# Patient Record
Sex: MALE | Race: WHITE | NOT HISPANIC OR LATINO | Employment: UNEMPLOYED | URBAN - METROPOLITAN AREA
[De-identification: names, ages, dates, MRNs, and addresses within clinical notes are randomized per-mention and may not be internally consistent; named-entity substitution may affect disease eponyms.]

---

## 2018-02-12 ENCOUNTER — HOSPITAL ENCOUNTER (EMERGENCY)
Facility: OTHER | Age: 28
Discharge: HOME | End: 2018-02-12
Attending: EMERGENCY MEDICINE
Payer: COMMERCIAL

## 2018-02-12 VITALS
TEMPERATURE: 98 F | HEIGHT: 71 IN | HEART RATE: 74 BPM | SYSTOLIC BLOOD PRESSURE: 125 MMHG | OXYGEN SATURATION: 98 % | RESPIRATION RATE: 18 BRPM | WEIGHT: 195 LBS | DIASTOLIC BLOOD PRESSURE: 58 MMHG | BODY MASS INDEX: 27.3 KG/M2

## 2018-02-12 DIAGNOSIS — S63.91XA SPRAIN OF RIGHT HAND, INITIAL ENCOUNTER: Primary | ICD-10-CM

## 2018-02-12 PROCEDURE — 99283 EMERGENCY DEPT VISIT LOW MDM: CPT

## 2018-02-12 RX ORDER — IBUPROFEN 800 MG/1
800 TABLET ORAL EVERY 6 HOURS PRN
Qty: 15 TABLET | Refills: 0 | Status: SHIPPED | OUTPATIENT
Start: 2018-02-12

## 2018-02-12 NOTE — ED TRIAGE NOTES
Pt reports R hand pain after playing frisbee 2 weeks ago. Reports pain hasn't been improving. Pt reports swelling has gone down but pain to R base of thumb continues.

## 2018-02-12 NOTE — ED PROVIDER NOTES
Encounter Date: 2/12/2018       History     Chief Complaint   Patient presents with    Hand Pain     R palm x2 days after trip and fall     Patient is a 27-year-old male with no pertinent past medical history presents to the ED with hand pain.  Patient states approximately 2 weeks ago was playing Frisbee when he fell on his outstretched right hand.  He reports swelling and pain to the thenar aspect of his right hand.  He states the swelling subsided one-week ago but the pain has persisted.  He reports taking 1000 mg of ibuprofen with no relief.  He denies numbness or tingling to this area.      The history is provided by the patient.     Review of patient's allergies indicates:  No Known Allergies  History reviewed. No pertinent past medical history.  Past Surgical History:   Procedure Laterality Date    EYE SURGERY       History reviewed. No pertinent family history.  Social History   Substance Use Topics    Smoking status: Current Every Day Smoker     Types: Cigarettes    Smokeless tobacco: Not on file    Alcohol use Yes      Comment: socially     Review of Systems   Constitutional: Negative for chills and fever.   HENT: Negative for congestion and sore throat.    Eyes: Negative for pain.   Respiratory: Negative for shortness of breath.    Cardiovascular: Negative for chest pain.   Gastrointestinal: Negative for abdominal pain, diarrhea, nausea and vomiting.   Genitourinary: Negative for dysuria.   Musculoskeletal:        Right hand pain   Skin: Negative for rash.   Neurological: Negative for headaches.       Physical Exam     Initial Vitals [02/12/18 1221]   BP Pulse Resp Temp SpO2   (!) 143/63 90 18 97.6 °F (36.4 °C) 99 %      MAP       89.67         Physical Exam    Constitutional: Vital signs are normal. He is not diaphoretic. He is cooperative. No distress.   HENT:   Head: Normocephalic and atraumatic.   Mouth/Throat: Oropharynx is clear and moist.   Eyes: Conjunctivae and EOM are normal. Pupils are  equal, round, and reactive to light.   Neck: Normal range of motion. Neck supple.   Cardiovascular: Normal rate, regular rhythm and intact distal pulses.   No murmur heard.  Pulmonary/Chest: Breath sounds normal. He has no wheezes. He has no rhonchi. He has no rales.   Abdominal: Soft. Bowel sounds are normal. There is no tenderness. There is no rebound and no guarding.   Musculoskeletal:   RUE: Full range of motion to right wrist.  No obvious deformity or edema.  Reported pain with adduction of the thumb and with palpation of the thenar eminence.  No snuffbox tenderness.   Neurological: He is alert and oriented to person, place, and time. He has normal strength. No cranial nerve deficit. GCS eye subscore is 4. GCS verbal subscore is 5. GCS motor subscore is 6.   Skin: Skin is warm and dry. Capillary refill takes less than 2 seconds. No rash noted.   Psychiatric: He has a normal mood and affect. His behavior is normal.         ED Course   Procedures  Labs Reviewed - No data to display          Medical Decision Making:   Initial Assessment:   Urgent evaluation of a 27 y.o. male presenting to the emergency department complaining of hand pain. Patient is afebrile, nontoxic appearing and hemodynamically stable.  ED Management:  Patient with reported right hand pain secondary to injury.  No obvious deformity or swelling on exam.  Limb is distally neurovascularly intact.  X-ray reveals no evidence of fracture or dislocation with no soft tissue swelling.  I do not suspect an occult fracture.  Patient will be placed in a Velcro splint.  I have given him referral to orthopedics.  I have advised that he alternate taking ibuprofen and Tylenol.  He is amenable to this plan. I have given specific return precautions. I have discussed the patient with the attending thoroughly and he/she agrees to the treatment and plan.   This note was created using Dragon Medical Dictation. There may be typographical errors secondary to  dictation.                    ED Course      Clinical Impression:     1. Sprain of right hand, initial encounter                             Erwin Ragsdale PA-C  02/12/18 [id number]